# Patient Record
Sex: MALE | Race: WHITE | NOT HISPANIC OR LATINO | Employment: STUDENT | URBAN - METROPOLITAN AREA
[De-identification: names, ages, dates, MRNs, and addresses within clinical notes are randomized per-mention and may not be internally consistent; named-entity substitution may affect disease eponyms.]

---
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
## 2020-01-31 DIAGNOSIS — G44.221 CHRONIC TENSION-TYPE HEADACHE, INTRACTABLE: ICD-10-CM

## 2020-01-31 RX ORDER — BUTALBITAL, ASPIRIN, AND CAFFEINE 325; 50; 40 MG/1; MG/1; MG/1
1 CAPSULE ORAL EVERY 6 HOURS PRN
Qty: 30 CAPSULE | Refills: 0 | Status: SHIPPED | OUTPATIENT
Start: 2020-01-31 | End: 2020-05-05 | Stop reason: SDUPTHER

## 2020-03-26 ENCOUNTER — OFFICE VISIT (OUTPATIENT)
Dept: INTERNAL MEDICINE | Facility: CLINIC | Age: 20
End: 2020-03-26
Payer: MEDICAID

## 2020-03-26 ENCOUNTER — PATIENT MESSAGE (OUTPATIENT)
Dept: INTERNAL MEDICINE | Facility: CLINIC | Age: 20
End: 2020-03-26

## 2020-03-26 DIAGNOSIS — R82.90 ABNORMAL URINE ODOR: Primary | ICD-10-CM

## 2020-03-26 DIAGNOSIS — N48.89 PEARLY PENILE PAPULES: ICD-10-CM

## 2020-03-26 PROCEDURE — 99213 OFFICE O/P EST LOW 20 MIN: CPT | Mod: 95,,, | Performed by: FAMILY MEDICINE

## 2020-03-26 PROCEDURE — 99213 PR OFFICE/OUTPT VISIT, EST, LEVL III, 20-29 MIN: ICD-10-PCS | Mod: 95,,, | Performed by: FAMILY MEDICINE

## 2020-03-26 NOTE — PROGRESS NOTES
The patient location is: [place] (Home)  The chief complaint leading to consultation is: urine odor and bumps on penis  Visit type: Virtual visit with synchronous audio and video  Time start 123  Time [id number]  Total time spent with patient: 9 minutes  Each patient to whom he or she provides medical services by telemedicine is:  (1) informed of the relationship between the physician and patient and the respective role of any other health care provider with respect to management of the patient; and (2) notified that he or she may decline to receive medical services by telemedicine and may withdraw from such care at any time.    Subjective:       Patient ID: Darwin Boateng is a 19 y.o. male.    Chief Complaint: No chief complaint on file.    HPI Mr. Boateng presents today via telemedicine for bumps on penis and odor to urine.  Notes small white bumps on penis.   Doesn't notice the bumps with erection  Sometime you can feel   5 years he has noted them   Small white   Some will go away   No pain or itching    Voiding is normal  Strong odor   No change in medication   No pain  Took a shower and didn't know much of an odor  Drinks 3-4 bottles of water a day.  Increased frequency     Review of Systems    see HPI    Past Medical History:   Diagnosis Date    Migraine      No past surgical history on file.  Family History   Problem Relation Age of Onset    Hyperlipidemia Mother     Depression Mother     Heart disease Father     Heart attacks under age 50 Father          Objective:        Physical Exam   Constitutional: He is oriented to person, place, and time. He appears well-developed and well-nourished.   HENT:   Head: Normocephalic and atraumatic.   Neurological: He is alert and oriented to person, place, and time.   Vitals reviewed.        Assessment/Plan:     Abnormal urine odor  -     URINALYSIS; Future; Expected date: 03/26/2020  -     Urine culture; Future; Expected date: 03/26/2020    Pearly penile  papules    Discussed nothing to do for this. Will contact provider if anything changes, itching, bleeding or pain      Follow up as needed     Makenna Garcia MD  [facility]

## 2020-05-05 DIAGNOSIS — G44.221 CHRONIC TENSION-TYPE HEADACHE, INTRACTABLE: ICD-10-CM

## 2020-05-06 RX ORDER — BUTALBITAL, ASPIRIN, AND CAFFEINE 325; 50; 40 MG/1; MG/1; MG/1
1 CAPSULE ORAL EVERY 6 HOURS PRN
Qty: 30 CAPSULE | Refills: 0 | Status: SHIPPED | OUTPATIENT
Start: 2020-05-06 | End: 2020-07-29 | Stop reason: SDUPTHER

## 2020-06-24 ENCOUNTER — OFFICE VISIT (OUTPATIENT)
Dept: URGENT CARE | Facility: CLINIC | Age: 20
End: 2020-06-24
Payer: MEDICAID

## 2020-06-24 VITALS
HEIGHT: 71 IN | SYSTOLIC BLOOD PRESSURE: 124 MMHG | OXYGEN SATURATION: 99 % | WEIGHT: 138.56 LBS | BODY MASS INDEX: 19.4 KG/M2 | HEART RATE: 81 BPM | TEMPERATURE: 97 F | RESPIRATION RATE: 18 BRPM | DIASTOLIC BLOOD PRESSURE: 86 MMHG

## 2020-06-24 DIAGNOSIS — J02.0 STREP PHARYNGITIS: Primary | ICD-10-CM

## 2020-06-24 DIAGNOSIS — J02.9 PHARYNGITIS, UNSPECIFIED ETIOLOGY: ICD-10-CM

## 2020-06-24 LAB
CTP QC/QA: YES
S PYO RRNA THROAT QL PROBE: NEGATIVE

## 2020-06-24 PROCEDURE — 99214 PR OFFICE/OUTPT VISIT, EST, LEVL IV, 30-39 MIN: ICD-10-PCS | Mod: S$PBB,,, | Performed by: PHYSICIAN ASSISTANT

## 2020-06-24 PROCEDURE — 99999 PR PBB SHADOW E&M-EST. PATIENT-LVL IV: CPT | Mod: PBBFAC,,, | Performed by: PHYSICIAN ASSISTANT

## 2020-06-24 PROCEDURE — 87880 STREP A ASSAY W/OPTIC: CPT | Mod: PBBFAC,PO | Performed by: PHYSICIAN ASSISTANT

## 2020-06-24 PROCEDURE — 99214 OFFICE O/P EST MOD 30 MIN: CPT | Mod: PBBFAC,PO | Performed by: PHYSICIAN ASSISTANT

## 2020-06-24 PROCEDURE — 99999 PR PBB SHADOW E&M-EST. PATIENT-LVL IV: ICD-10-PCS | Mod: PBBFAC,,, | Performed by: PHYSICIAN ASSISTANT

## 2020-06-24 PROCEDURE — 87081 CULTURE SCREEN ONLY: CPT

## 2020-06-24 PROCEDURE — 99214 OFFICE O/P EST MOD 30 MIN: CPT | Mod: S$PBB,,, | Performed by: PHYSICIAN ASSISTANT

## 2020-06-24 RX ORDER — AZITHROMYCIN 250 MG/1
TABLET, FILM COATED ORAL
Qty: 6 TABLET | Refills: 0 | Status: SHIPPED | OUTPATIENT
Start: 2020-06-24 | End: 2020-06-29

## 2020-06-24 NOTE — PROGRESS NOTES
"Subjective:       History was provided by the patient.  Darwin Boateng is a 19 y.o. who presents for evaluation of a sore throat. Associated symptoms include sore throat and swollen glands. Onset of symptoms was 2 days ago, gradually worsening since that time.  He has not had recent close exposure to someone with proven streptococcal pharyngitis.  The following portions of the patient's history were reviewed and updated as appropriate: allergies, current medications, past family history, past medical history, past social history, past surgical history and problem list.  He denies cough, SOB, fever, bodyaches or chills    Review of Systems  Review of Systems   Constitutional: Negative for fever.   HENT: Positive for sore throat.    Respiratory: Negative for shortness of breath.    Cardiovascular: Negative for chest pain.   Gastrointestinal: Negative for abdominal pain and nausea.   Genitourinary: Negative for dysuria and frequency.   Musculoskeletal: Negative for back pain.   Neurological: Negative for headaches.   All other systems reviewed and are negative.         Objective:   /86 (BP Location: Right arm, Patient Position: Sitting)   Pulse 81   Temp 97.3 °F (36.3 °C) (Tympanic)   Resp 18   Ht 5' 11" (1.803 m)   Wt 62.9 kg (138 lb 9 oz)   SpO2 99%   BMI 19.33 kg/m²   Physical Exam   Constitutional: He is oriented to person, place, and time and well-developed, well-nourished, and in no distress.   HENT:   Head: Normocephalic.   Right Ear: External ear normal.   Left Ear: External ear normal.   Mouth/Throat: Uvula is midline and mucous membranes are normal. Posterior oropharyngeal edema and posterior oropharyngeal erythema present. No oropharyngeal exudate.   Neck: Normal range of motion.   Cardiovascular: Normal rate and normal heart sounds.   Pulmonary/Chest: Effort normal and breath sounds normal. No respiratory distress.   Lymphadenopathy:     He has cervical adenopathy.   Neurological: He is alert " and oriented to person, place, and time.   Skin: Skin is warm.   Psychiatric: Affect normal.   Vitals reviewed.         Assessment:     Encounter Diagnoses   Name Primary?    Pharyngitis, unspecified etiology     Strep pharyngitis Yes        Plan:     Antibiotics sent to pharmacy.  Replace toothbrush.  Contagious for 24 hours after starting antibiotics.  Tylenol and Ibuprofen for fever and discomfort.  Salt water gargles for sore throat relief.  Follow up with primary care physician in 1 week if symptoms do not resolve.  Report to ER with new or worsening symptoms.  Red flags include muffled voices, swelling in back of the throat, fever uncontrolled, etc.

## 2020-06-24 NOTE — PATIENT INSTRUCTIONS
Azithromycin sent to pharmacy.  Tylenol and/or Ibuprofen for pain or fevers.  Salt water gargles.  Rest and increased fluid intake.  Follow up with primary care physician in 1-2 weeks.  Report to ER with new or worsening symptoms.      Strep Throat  Strep throat is a throat infection caused by a bacteria called group A Streptococcus bacteria (group A strep). The bacteria live in the nose and throat. Strep throat is contagious and spreads easily from person to person through airborne droplets when an infected person coughs, sneezes, or talks. Good hand washing is important to help prevent the spread of this illness.  Children diagnosed with strep throat should not attend school or  until they have been taking antibiotics and had no fever for 24 hours.  Strep throat mainly affects school-aged children between 5 and 15 years of age, but can affect adults too. When it isn't treated, it can lead to serious problems including rheumatic fever (an inflammation of the joints and heart) and kidney damage.    How is strep throat spread?  Strep throat can be easily spread from an infected person's saliva by:  · Drinking and eating after them  · Sharing a straw, cup, toothbrushes, and eating utensils  When to go to the emergency room (ER)  Call 911 if your child has trouble breathing or swallowing. Call your healthcare provider about other symptoms of strep throat, such as:  · Throat pain, especially when swallowing  · Red, swollen tonsils  · Swollen lymph glands  · Stomachache; sometimes, vomiting in younger children  · Pus in the back of the throat  What to expect in the ER  · Your child will be examined and the healthcare provider will ask about his or her medical history.  · The child's tonsils will be examined. A sample of fluid may be taken from the back of the throat using a soft swab. The sample can be checked right away for the bacteria that cause strep throat. Another sample may also be sent to a lab for  testing.  · An antibiotic is usually prescribed to kill the bacteria. Be sure your child takes all the medicine, even if he or she starts to feel better. (Note that antibiotics will not help a viral throat infection.)  · If swallowing is very painful, painkilling medicine may also be prescribed.  When to call your healthcare provider  Call your healthcare provider if your otherwise healthy child has finished the treatment for strep throat and has:  · Joint pain or swelling  · Shortness of breath  · Signs of dehydration (no tears when crying and not urinating for more than 8 hours)  · Ear pain or pressure  · Headaches  · Rash  · Fever (see Fever and children, below)  Fever and children  Always use a digital thermometer to check your childs temperature. Never use a mercury thermometer.  For infants and toddlers, be sure to use a rectal thermometer correctly. A rectal thermometer may accidentally poke a hole in (perforate) the rectum. It may also pass on germs from the stool. Always follow the product makers directions for proper use. If you dont feel comfortable taking a rectal temperature, use another method. When you talk to your childs healthcare provider, tell him or her which method you used to take your childs temperature.  Here are guidelines for fever temperature. Ear temperatures arent accurate before 6 months of age. Dont take an oral temperature until your child is at least 4 years old.  Infant under 3 months old:  · Ask your childs healthcare provider how you should take the temperature.  · Rectal or forehead (temporal artery) temperature of 100.4°F (38°C) or higher, or as directed by the provider  · Armpit temperature of 99°F (37.2°C) or higher, or as directed by the provider  Child age 3 to 36 months:  · Rectal, forehead (temporal artery), or ear temperature of 102°F (38.9°C) or higher, or as directed by the provider  · Armpit temperature of 101°F (38.3°C) or higher, or as directed by the  provider  Child of any age:  · Repeated temperature of 104°F (40°C) or higher, or as directed by the provider  · Fever that lasts more than 24 hours in a child under 2 years old. Or a fever that lasts for 3 days in a child 2 years or older.   Easing strep throat symptoms  These tips can help ease your child's symptoms:  · Offer easy-to-swallow foods, such as soup, applesauce, popsicles, cold drinks, milk shakes, and yogurt.  · Provide a soft diet and avoid spicy or acidic foods.  · Use a cool-mist humidifier in the child's bedroom.  · Gargle with saltwater (for older children and adults only). Mix 1/4 teaspoon salt in 1 cup (8 oz) of warm water.   Date Last Reviewed: 1/1/2017  © 7465-5815 [employer]. [address] [place] [address], [place] [address]. All rights reserved. This information is not intended as a substitute for professional medical care. Always follow your healthcare professional's instructions.

## 2020-06-24 NOTE — LETTER
June 24, 2020      [facility]  [address]  [facility] [address]  Phone: [phone]  Fax: [phone]       Patient: Darwin Boateng   YOB: 2000  Date of Visit: 06/24/2020    To Whom It May Concern:    Sharmaine Boateng  was at [facility] on 06/24/2020. He may return to work on 6/26/2020 with no restrictions. If you have any questions or concerns, or if I can be of further assistance, please do not hesitate to contact me.    Sincerely,    Que Lopez PA-C

## 2020-06-27 LAB — BACTERIA THROAT CULT: NORMAL

## 2020-11-20 DIAGNOSIS — G44.221 CHRONIC TENSION-TYPE HEADACHE, INTRACTABLE: ICD-10-CM

## 2020-11-21 RX ORDER — BUTALBITAL, ASPIRIN, AND CAFFEINE 325; 50; 40 MG/1; MG/1; MG/1
1 CAPSULE ORAL EVERY 6 HOURS PRN
Qty: 30 CAPSULE | Refills: 0 | Status: SHIPPED | OUTPATIENT
Start: 2020-11-21 | End: 2021-01-22 | Stop reason: SDUPTHER

## 2021-01-25 ENCOUNTER — PATIENT MESSAGE (OUTPATIENT)
Dept: INTERNAL MEDICINE | Facility: CLINIC | Age: 21
End: 2021-01-25

## 2021-03-30 ENCOUNTER — PATIENT MESSAGE (OUTPATIENT)
Dept: INTERNAL MEDICINE | Facility: CLINIC | Age: 21
End: 2021-03-30

## 2022-01-25 NOTE — PROGRESS NOTES
Subjective:       Patient ID: Darwin Boateng is a 18 y.o. male.    Chief Complaint: Ingrown Toenail (right foot)    HPI Mr. Boateng presents today for possible ingrown toenail of the right great toe.     Noted this past Friday it was hurting.   Had some discharge     Review of Systems   Constitutional: Negative.    Genitourinary: Negative.    Musculoskeletal: Negative.    Neurological: Negative.    Psychiatric/Behavioral: Negative.          Objective:        Physical Exam   Constitutional: He appears well-developed and well-nourished.   HENT:   Head: Normocephalic and atraumatic.   Musculoskeletal: Normal range of motion.   ingrown toenail with erythema around the edge and swelling  No discharge today   Vitals reviewed.        Assessment/Plan:     Paronychia of great toe of right foot  -     sulfamethoxazole-trimethoprim 800-160mg (BACTRIM DS) 800-160 mg Tab; Take 1 tablet by mouth 2 (two) times daily.  Dispense: 10 tablet; Refill: 0    Screening cholesterol level  -     Lipid panel; Future; Expected date: 03/11/2019      Warm soak 20 minutes a day. Let toenail grow out and lift the end of it when cutting.   Return to clinic if need to be seen to have it cut    Discussed possible toenail removal.     Follow-up if symptoms worsen or fail to improve.       Makenna Garcia MD  [facility]   Family Medicine   25-Feb-2021 24-Feb-2021

## 2022-09-22 ENCOUNTER — PATIENT OUTREACH (OUTPATIENT)
Dept: ADMINISTRATIVE | Facility: HOSPITAL | Age: 22
End: 2022-09-22
Payer: MEDICAID

## 2022-09-22 NOTE — PROGRESS NOTES
Attempted to contact patient by phone for PCP visit, was not able to leave voice mail message. No working numbers.